# Patient Record
Sex: MALE | Race: BLACK OR AFRICAN AMERICAN | Employment: OTHER | URBAN - METROPOLITAN AREA
[De-identification: names, ages, dates, MRNs, and addresses within clinical notes are randomized per-mention and may not be internally consistent; named-entity substitution may affect disease eponyms.]

---

## 2017-01-11 ENCOUNTER — TELEPHONE (OUTPATIENT)
Dept: SLEEP MEDICINE | Age: 72
End: 2017-01-11

## 2017-01-11 NOTE — TELEPHONE ENCOUNTER
Re-called pt to [facility] sleep study per [employer] office spoke w/spouse stated to return call to pt for spouse did not know when pt would like to [facility] .   Stated to spouse pt need to be [facility]'ed before f/u with [employer] has had order from later part of Dec/2016

## 2017-01-12 ENCOUNTER — HOSPITAL ENCOUNTER (OUTPATIENT)
Dept: SLEEP MEDICINE | Age: 72
Discharge: HOME | End: 2017-01-12
Payer: MEDICARE

## 2017-01-12 DIAGNOSIS — N18.9 CKD (CHRONIC KIDNEY DISEASE): ICD-10-CM

## 2017-01-12 DIAGNOSIS — G47.33 OSA (OBSTRUCTIVE SLEEP APNEA): ICD-10-CM

## 2017-01-12 DIAGNOSIS — D50.9 IRON DEFICIENCY ANEMIA: ICD-10-CM

## 2017-01-12 DIAGNOSIS — K25.9: ICD-10-CM

## 2017-01-12 DIAGNOSIS — F17.200 SMOKING: ICD-10-CM

## 2017-01-12 DIAGNOSIS — I10 HYPERTENSION: ICD-10-CM

## 2017-01-12 DIAGNOSIS — N18.9 RENAL FAILURE, CHRONIC: ICD-10-CM

## 2017-01-12 DIAGNOSIS — M19.90 OSTEOARTHRITIS: ICD-10-CM

## 2017-01-12 PROCEDURE — 95811 POLYSOM 6/>YRS CPAP 4/> PARM: CPT

## 2017-01-13 NOTE — PROGRESS NOTES
· Patient arrived for sleep study. · Physician orders were reviewed. · Patient education to include initiation of PAP therapy per protocol. · Patient questions were answered. · The patient demonstrated good understanding of the positive airway pressure device.   [facility]

## 2017-01-13 NOTE — PROGRESS NOTES
 Sleep study completed per physician order.  Patient discharged from sleep center.  Patient awake, alert and able to leave the facility under their own power. Instructed to follow up with their sleep physician for results.    [facility]